# Patient Record
Sex: FEMALE | Race: WHITE
[De-identification: names, ages, dates, MRNs, and addresses within clinical notes are randomized per-mention and may not be internally consistent; named-entity substitution may affect disease eponyms.]

---

## 2018-07-01 ENCOUNTER — HOSPITAL ENCOUNTER (EMERGENCY)
Dept: HOSPITAL 72 - EMR | Age: 38
LOS: 1 days | Discharge: HOME | End: 2018-07-02
Payer: COMMERCIAL

## 2018-07-01 VITALS — DIASTOLIC BLOOD PRESSURE: 71 MMHG | SYSTOLIC BLOOD PRESSURE: 119 MMHG

## 2018-07-01 VITALS — WEIGHT: 140 LBS | BODY MASS INDEX: 22.5 KG/M2 | HEIGHT: 66 IN

## 2018-07-01 DIAGNOSIS — J45.901: Primary | ICD-10-CM

## 2018-07-01 PROCEDURE — 99284 EMERGENCY DEPT VISIT MOD MDM: CPT

## 2018-07-01 PROCEDURE — 94640 AIRWAY INHALATION TREATMENT: CPT

## 2018-07-01 RX ADMIN — ALBUTEROL SULFATE SCH MG: 2.5 SOLUTION RESPIRATORY (INHALATION) at 23:40

## 2018-07-01 RX ADMIN — Medication SCH MCG: at 23:23

## 2018-07-01 RX ADMIN — Medication SCH MCG: at 23:40

## 2018-07-01 RX ADMIN — ALBUTEROL SULFATE SCH MG: 2.5 SOLUTION RESPIRATORY (INHALATION) at 23:23

## 2018-07-02 VITALS — SYSTOLIC BLOOD PRESSURE: 119 MMHG | DIASTOLIC BLOOD PRESSURE: 71 MMHG

## 2018-07-02 RX ADMIN — ALBUTEROL SULFATE SCH MG: 2.5 SOLUTION RESPIRATORY (INHALATION) at 00:04

## 2018-07-02 RX ADMIN — Medication SCH MCG: at 00:04

## 2018-07-02 NOTE — EMERGENCY ROOM REPORT
History of Present Illness


General


Chief Complaint:  Asthma


Source:  Patient





Present Illness


HPI


38-year-old female presents ED complaining of wheezing times one day.  History 

of asthma states her asthma is likely triggered by lots of dust during her 

moving.  States she ran out of her inhaler.  Denies smoking.  Denies fevers or 

chills.  Denies cough.  No other aggravating relieving factors.  Denies any 

other associated symptoms


Allergies:  


Coded Allergies:  


     No Known Allergies (Unverified , 18)





Patient History


Past Medical History:  asthma


Past Surgical History:  none


Pertinent Family History:  none


Social History:  Denies: smoking, alcohol use, drug use


Last Menstrual Period:  18


Pregnant Now:  No


:  2


Para:  2


Immunizations:  UTD


Reviewed Nursing Documentation:  PMH: Agreed; PSxH: Agreed





Nursing Documentation-PMH


Past Medical History:  No History, Except For


Hx Asthma:  Yes





Review of Systems


All Other Systems:  negative except mentioned in HPI





Physical Exam





Vital Signs








  Date Time  Temp Pulse Resp B/P (MAP) Pulse Ox O2 Delivery O2 Flow Rate FiO2


 


18 23:01 98.1 89 18 119/71 94   





 98.1       


 


18 23:24      Room Air  21








Sp02 EP Interpretation:  reviewed, normal


General Appearance:  no apparent distress, alert, GCS 15, non-toxic


Head:  normocephalic


Eyes:  bilateral eye normal inspection, bilateral eye PERRL


ENT:  normal ENT inspection


Neck:  normal inspection


Respiratory:  wheezing


Cardiovascular #1:  normal inspection


Gastrointestinal:  normal inspection


Rectal:  deferred


Genitourinary:  no CVA tenderness


Musculoskeletal:  normal inspection


Neurologic:  alert, oriented x3, responsive, motor strength/tone normal, 

sensory intact, speech normal


Psychiatric:  normal inspection


Skin:  normal inspection


Lymphatic:  normal inspection





Medical Decision Making


Diagnostic Impression:  


 Primary Impression:  


 Asthma attack


 Qualified Codes:  J45.901 - Unspecified asthma with (acute) exacerbation


ER Course


Hospital Course 


38-year-old female presents to ED complaining of wheezing





Differential diagnoses include: URI, bronchitis, asthma/COPD, pneumonia 





Clinical course


Patient placed on stretcher.  After initial history, physical exam reveals a 

female in no acute distress.  Bilateral TM unremarkable.  No pharyngeal 

erythema.  No tonsillar exudates.  No lymphadenopathy.  Mild wheezing noted on 

exam, no signs of respiratory distress or retractions.  Patient given 

Prednisone and albuterol/atrovent treatment in ED with symptoms improved.  

Reassurance given





Diagnosis - asthma attack





Stable and discharged home with prescriptions for albuterol, prednisone.  

Instructed to followup with PMD.  Return to ED if symptoms recur or worsen





Last Vital Signs








  Date Time  Temp Pulse Resp B/P (MAP) Pulse Ox O2 Delivery O2 Flow Rate FiO2


 


18 00:16 98.1  20 119/71 100 Room Air  21





 98.1       


 


18 23:52  86      








Status:  improved


Disposition:  HOME, SELF-CARE


Condition:  Stable


Scripts


Albuterol Sulfate* (ALBUTEROL SULFATE HHN*) 2.5 Mg/3 Ml Vial.neb


2.5 MG HHN Q4H PRN for Shortness of Breath, #25 VIAL


   Prov: Silvino Euceda MD         18 


Albuterol Sulfate (VENTOLIN HFA) 18 Gm Hfa.aer.ad


2 PUFFS INH EVERY 6 HOURS, #18 GM 0 Refills


   Prov: Silvino Euceda MD         18


Referrals:  


NOT CHOSEN [facility]/MD,REFERRING (PCP)


Patient Instructions:  Asthma, Adult











Silvino Euceda MD 2018 01:24